# Patient Record
Sex: FEMALE | Race: WHITE
[De-identification: names, ages, dates, MRNs, and addresses within clinical notes are randomized per-mention and may not be internally consistent; named-entity substitution may affect disease eponyms.]

---

## 2018-07-18 ENCOUNTER — HOSPITAL ENCOUNTER (OUTPATIENT)
Dept: HOSPITAL 96 - M.MRI | Age: 63
End: 2018-07-18
Payer: COMMERCIAL

## 2018-07-18 DIAGNOSIS — I67.82: Primary | ICD-10-CM

## 2018-07-18 LAB
BUN SERPL-MCNC: 21 MG/DL (ref 7–18)
CREAT SERPL-MCNC: 0.9 MG/DL (ref 0.6–1.3)

## 2019-06-21 ENCOUNTER — HOSPITAL ENCOUNTER (EMERGENCY)
Dept: HOSPITAL 96 - M.ERS | Age: 64
Discharge: HOME | End: 2019-06-21
Payer: MEDICARE

## 2019-06-21 VITALS — HEIGHT: 62 IN | WEIGHT: 224.01 LBS | BODY MASS INDEX: 41.22 KG/M2

## 2019-06-21 VITALS — DIASTOLIC BLOOD PRESSURE: 85 MMHG | SYSTOLIC BLOOD PRESSURE: 157 MMHG

## 2019-06-21 DIAGNOSIS — Z88.2: ICD-10-CM

## 2019-06-21 DIAGNOSIS — L03.115: Primary | ICD-10-CM

## 2019-06-21 DIAGNOSIS — Z98.890: ICD-10-CM

## 2019-06-21 DIAGNOSIS — Z85.850: ICD-10-CM

## 2020-12-04 ENCOUNTER — HOSPITAL ENCOUNTER (EMERGENCY)
Dept: HOSPITAL 96 - M.ERS | Age: 65
Discharge: HOME | End: 2020-12-04
Payer: MEDICARE

## 2020-12-04 VITALS — HEIGHT: 61 IN | BODY MASS INDEX: 45.31 KG/M2 | WEIGHT: 240 LBS

## 2020-12-04 VITALS — SYSTOLIC BLOOD PRESSURE: 171 MMHG | DIASTOLIC BLOOD PRESSURE: 70 MMHG

## 2020-12-04 DIAGNOSIS — Z98.890: ICD-10-CM

## 2020-12-04 DIAGNOSIS — R07.81: Primary | ICD-10-CM

## 2020-12-04 DIAGNOSIS — Z88.5: ICD-10-CM

## 2020-12-04 DIAGNOSIS — R00.2: ICD-10-CM

## 2020-12-04 DIAGNOSIS — Z88.2: ICD-10-CM

## 2020-12-04 LAB
ABSOLUTE BASOPHILS: 0.1 THOU/UL (ref 0–0.2)
ABSOLUTE EOSINOPHILS: 0.1 THOU/UL (ref 0–0.7)
ABSOLUTE MONOCYTES: 0.8 THOU/UL (ref 0–1.2)
ALBUMIN SERPL-MCNC: 3.1 G/DL (ref 3.4–5)
ALP SERPL-CCNC: 135 U/L (ref 46–116)
ALT SERPL-CCNC: 30 U/L (ref 30–65)
ANION GAP SERPL CALC-SCNC: 9 MMOL/L (ref 7–16)
AST SERPL-CCNC: 30 U/L (ref 15–37)
BASOPHILS NFR BLD AUTO: 0.8 %
BILIRUB SERPL-MCNC: 0.3 MG/DL
BUN SERPL-MCNC: 13 MG/DL (ref 7–18)
CALCIUM SERPL-MCNC: 8.3 MG/DL (ref 8.5–10.1)
CHLORIDE SERPL-SCNC: 105 MMOL/L (ref 98–107)
CO2 SERPL-SCNC: 28 MMOL/L (ref 21–32)
CREAT SERPL-MCNC: 0.7 MG/DL (ref 0.6–1.3)
EOSINOPHIL NFR BLD: 1.4 %
GLUCOSE SERPL-MCNC: 104 MG/DL (ref 70–99)
GRANULOCYTES NFR BLD MANUAL: 69.1 %
HCT VFR BLD CALC: 35.8 % (ref 37–47)
HGB BLD-MCNC: 11.8 GM/DL (ref 12–15)
LIPASE: 40 U/L (ref 73–393)
LYMPHOCYTES # BLD: 1.3 THOU/UL (ref 0.8–5.3)
LYMPHOCYTES NFR BLD AUTO: 17.8 %
MAGNESIUM SERPL-MCNC: 1.9 MG/DL (ref 1.8–2.4)
MCH RBC QN AUTO: 34 PG (ref 26–34)
MCHC RBC AUTO-ENTMCNC: 33.1 G/DL (ref 28–37)
MCV RBC: 102.7 FL (ref 80–100)
MONOCYTES NFR BLD: 10.9 %
MPV: 10.4 FL. (ref 7.2–11.1)
NEUTROPHILS # BLD: 4.8 THOU/UL (ref 1.6–8.1)
NT-PRO BRAIN NAT PEPTIDE: 298 PG/ML (ref ?–300)
NUCLEATED RBCS: 0 /100WBC
PLATELET COUNT*: 275 THOU/UL (ref 150–400)
POTASSIUM SERPL-SCNC: 3.5 MMOL/L (ref 3.5–5.1)
PROT SERPL-MCNC: 7.3 G/DL (ref 6.4–8.2)
RBC # BLD AUTO: 3.48 MIL/UL (ref 4.2–5)
RDW-CV: 14.7 % (ref 10.5–14.5)
SODIUM SERPL-SCNC: 142 MMOL/L (ref 136–145)
WBC # BLD AUTO: 7 THOU/UL (ref 4–11)

## 2020-12-06 NOTE — EKG
[place] [address]
Phone:  [phone]                     ELECTROCARDIOGRAM REPORT      
_______________________________________________________________________________
 
Name:         AMITA HENSLEY                Room:                     [facility]#:    [id number]     Account #:     [id number]  
Admission:    20    Attend Phys:                     
Discharge:    20    Date of Birth: [birth year]  
Date of Service: 20  Report #:      [id number]
        [id number]
_______________________________________________________________________________
THIS REPORT FOR:  //name//                      
 
                         [facility] ED
                                       
Test Date:    2020               Test Time:    18:33:09
Pat Name:     AMITA HENSLEY             Department:   
Patient ID:   [id number]            Room:          
Gender:       F                        Technician:   [facility]
:          1955               Requested By: Hernando Bishop
Order Number: [id number] MD:   Momo Beasley
                                 Measurements
Intervals                              Axis          
Rate:         73                       P:            77
[place]:           157                      QRS:          40
QRSD:         96                       T:            28
QT:           404                                    
QTc:          446                                    
                           Interpretive Statements
Sinus rhythm
Minimal ST elevation, inferior leads
No previous ECG available for comparison
Electronically Signed On 2020 12:42:30 CST by Momo Beasley
[email or web address]
 
 
 
 
 
 
 
 
 
 
 
 
 
 
 
 
 
 
 
 
 
  <ELECTRONICALLY SIGNED>
                                           By: Momo Beasley MD, [facility]   
  20     1242
D: 20   _____________________________________
T: 20   Momo Beasley MD, FACC     /EPI

## 2022-02-08 NOTE — EKG
[place] [address]
Phone:  [phone]                     ELECTROCARDIOGRAM REPORT      
_______________________________________________________________________________
 
Name:         AYDENAMITA YOUSUF                Room:                     REG ER 
M.R.#:    [id number]     Account #:     [id number]  
Admission:    22    Attend Phys:                     
Discharge:                Date of Birth: [birth year]  
Date of Service: 22 1339  Report #:      [id number]
        [id number]
_______________________________________________________________________________
THIS REPORT FOR:  //name//                      
 
                         [facility] ED
                                       
Test Date:    2022               Test Time:    13:39:17
Pat Name:     AMITA HENSLEY             Department:   
Patient ID:   [id number]            Room:          
Gender:       F                        Technician:   [facility]
:          1955               Requested By: Deepak Milian
Order Number: [id number] MD:   Bryant Nelson
                                 Measurements
Intervals                              Axis          
Rate:         77                       P:            89
NE:           154                      QRS:          37
QRSD:         89                       T:            33
QT:           383                                    
QTc:          434                                    
                           Interpretive Statements
Sinus rhythm
RSR' in V1 or V2, probably normal variant
Compared to ECG 2020 18:33:09
RSR' in V1 or V2 now present
ST (T wave) deviation no longer present
Electronically Signed On 2022 14:30:43 CST by Bryant Nelson
[email or web address]
 
 
 
 
 
 
 
 
 
 
 
 
 
 
 
 
 
 
 
  <ELECTRONICALLY SIGNED>
                                           By: Bryant Nelson MD, [facility]     
  22     1430
D: 22 1339   _____________________________________
T: 22 1339   Bryant Nelson MD, [facility]       /EPI